# Patient Record
Sex: MALE | Race: WHITE | NOT HISPANIC OR LATINO | Employment: FULL TIME | URBAN - METROPOLITAN AREA
[De-identification: names, ages, dates, MRNs, and addresses within clinical notes are randomized per-mention and may not be internally consistent; named-entity substitution may affect disease eponyms.]

---

## 2017-03-07 ENCOUNTER — OFFICE VISIT (OUTPATIENT)
Dept: INTERNAL MEDICINE | Facility: CLINIC | Age: 52
End: 2017-03-07
Payer: COMMERCIAL

## 2017-03-07 ENCOUNTER — LAB VISIT (OUTPATIENT)
Dept: LAB | Facility: HOSPITAL | Age: 52
End: 2017-03-07
Attending: INTERNAL MEDICINE
Payer: COMMERCIAL

## 2017-03-07 VITALS
SYSTOLIC BLOOD PRESSURE: 126 MMHG | WEIGHT: 220.25 LBS | BODY MASS INDEX: 31.53 KG/M2 | HEIGHT: 70 IN | HEART RATE: 85 BPM | OXYGEN SATURATION: 96 % | TEMPERATURE: 98 F | DIASTOLIC BLOOD PRESSURE: 66 MMHG

## 2017-03-07 DIAGNOSIS — K76.0 FATTY LIVER: ICD-10-CM

## 2017-03-07 DIAGNOSIS — Z76.89 ENCOUNTER TO ESTABLISH CARE: Primary | ICD-10-CM

## 2017-03-07 DIAGNOSIS — Z86.010 HISTORY OF COLON POLYPS: ICD-10-CM

## 2017-03-07 DIAGNOSIS — E78.2 MIXED HYPERLIPIDEMIA: ICD-10-CM

## 2017-03-07 DIAGNOSIS — M47.26 OSTEOARTHRITIS OF SPINE WITH RADICULOPATHY, LUMBAR REGION: ICD-10-CM

## 2017-03-07 DIAGNOSIS — M50.30 DDD (DEGENERATIVE DISC DISEASE), CERVICAL: ICD-10-CM

## 2017-03-07 DIAGNOSIS — R53.83 LETHARGY: ICD-10-CM

## 2017-03-07 DIAGNOSIS — R53.82 CHRONIC FATIGUE: ICD-10-CM

## 2017-03-07 DIAGNOSIS — G47.33 OSA (OBSTRUCTIVE SLEEP APNEA): ICD-10-CM

## 2017-03-07 DIAGNOSIS — K21.00 GASTROESOPHAGEAL REFLUX DISEASE WITH ESOPHAGITIS: ICD-10-CM

## 2017-03-07 DIAGNOSIS — R10.13 ABDOMINAL PAIN, CHRONIC, EPIGASTRIC: ICD-10-CM

## 2017-03-07 DIAGNOSIS — G89.29 CHRONIC RADICULAR CERVICAL PAIN: ICD-10-CM

## 2017-03-07 DIAGNOSIS — F51.01 PRIMARY INSOMNIA: ICD-10-CM

## 2017-03-07 DIAGNOSIS — R74.8 ELEVATED LIVER ENZYMES: ICD-10-CM

## 2017-03-07 DIAGNOSIS — G89.29 ABDOMINAL PAIN, CHRONIC, EPIGASTRIC: ICD-10-CM

## 2017-03-07 DIAGNOSIS — K52.9 CHRONIC DIARRHEA: ICD-10-CM

## 2017-03-07 DIAGNOSIS — M54.12 CHRONIC RADICULAR CERVICAL PAIN: ICD-10-CM

## 2017-03-07 DIAGNOSIS — E66.09 NON MORBID OBESITY DUE TO EXCESS CALORIES: ICD-10-CM

## 2017-03-07 DIAGNOSIS — I10 ESSENTIAL HYPERTENSION: ICD-10-CM

## 2017-03-07 DIAGNOSIS — J30.1 SEASONAL ALLERGIC RHINITIS DUE TO POLLEN: ICD-10-CM

## 2017-03-07 DIAGNOSIS — F41.9 ANXIETY: ICD-10-CM

## 2017-03-07 PROBLEM — J30.2 SEASONAL ALLERGIC RHINITIS: Status: ACTIVE | Noted: 2017-03-07

## 2017-03-07 PROBLEM — Z86.0100 HISTORY OF COLON POLYPS: Status: ACTIVE | Noted: 2017-03-07

## 2017-03-07 LAB
ALBUMIN SERPL BCP-MCNC: 4 G/DL
ALP SERPL-CCNC: 66 U/L
ALT SERPL W/O P-5'-P-CCNC: 79 U/L
ANION GAP SERPL CALC-SCNC: 7 MMOL/L
AST SERPL-CCNC: 38 U/L
BASOPHILS # BLD AUTO: 0.01 K/UL
BASOPHILS NFR BLD: 0.2 %
BILIRUB SERPL-MCNC: 0.5 MG/DL
BILIRUB UR QL STRIP: NEGATIVE
BUN SERPL-MCNC: 14 MG/DL
CALCIUM SERPL-MCNC: 9.4 MG/DL
CHLORIDE SERPL-SCNC: 101 MMOL/L
CLARITY UR REFRACT.AUTO: CLEAR
CO2 SERPL-SCNC: 33 MMOL/L
COLOR UR AUTO: YELLOW
CREAT SERPL-MCNC: 1.1 MG/DL
DIFFERENTIAL METHOD: ABNORMAL
EOSINOPHIL # BLD AUTO: 0.1 K/UL
EOSINOPHIL NFR BLD: 1.9 %
ERYTHROCYTE [DISTWIDTH] IN BLOOD BY AUTOMATED COUNT: 12.2 %
EST. GFR  (AFRICAN AMERICAN): >60 ML/MIN/1.73 M^2
EST. GFR  (NON AFRICAN AMERICAN): >60 ML/MIN/1.73 M^2
GGT SERPL-CCNC: 51 U/L
GLUCOSE SERPL-MCNC: 130 MG/DL
GLUCOSE UR QL STRIP: NEGATIVE
HCT VFR BLD AUTO: 41.6 %
HGB BLD-MCNC: 13.6 G/DL
HGB UR QL STRIP: NEGATIVE
KETONES UR QL STRIP: NEGATIVE
LEUKOCYTE ESTERASE UR QL STRIP: NEGATIVE
LYMPHOCYTES # BLD AUTO: 1.5 K/UL
LYMPHOCYTES NFR BLD: 28.5 %
MCH RBC QN AUTO: 30.2 PG
MCHC RBC AUTO-ENTMCNC: 32.7 %
MCV RBC AUTO: 92 FL
MONOCYTES # BLD AUTO: 0.6 K/UL
MONOCYTES NFR BLD: 11.9 %
NEUTROPHILS # BLD AUTO: 3 K/UL
NEUTROPHILS NFR BLD: 57.5 %
NITRITE UR QL STRIP: NEGATIVE
PH UR STRIP: 6 [PH] (ref 5–8)
PLATELET # BLD AUTO: 194 K/UL
PMV BLD AUTO: 10.6 FL
POTASSIUM SERPL-SCNC: 3.9 MMOL/L
PROT SERPL-MCNC: 7.1 G/DL
PROT UR QL STRIP: NEGATIVE
RBC # BLD AUTO: 4.5 M/UL
SODIUM SERPL-SCNC: 141 MMOL/L
SP GR UR STRIP: 1.01 (ref 1–1.03)
TSH SERPL DL<=0.005 MIU/L-ACNC: 2.64 UIU/ML
URN SPEC COLLECT METH UR: NORMAL
UROBILINOGEN UR STRIP-ACNC: NEGATIVE EU/DL
WBC # BLD AUTO: 5.19 K/UL

## 2017-03-07 PROCEDURE — 81003 URINALYSIS AUTO W/O SCOPE: CPT

## 2017-03-07 PROCEDURE — 3074F SYST BP LT 130 MM HG: CPT | Mod: S$GLB,,, | Performed by: INTERNAL MEDICINE

## 2017-03-07 PROCEDURE — 83516 IMMUNOASSAY NONANTIBODY: CPT | Mod: 59

## 2017-03-07 PROCEDURE — 83036 HEMOGLOBIN GLYCOSYLATED A1C: CPT

## 2017-03-07 PROCEDURE — 82977 ASSAY OF GGT: CPT | Mod: 91

## 2017-03-07 PROCEDURE — 3078F DIAST BP <80 MM HG: CPT | Mod: S$GLB,,, | Performed by: INTERNAL MEDICINE

## 2017-03-07 PROCEDURE — 99999 PR PBB SHADOW E&M-NEW PATIENT-LVL IV: CPT | Mod: PBBFAC,,, | Performed by: INTERNAL MEDICINE

## 2017-03-07 PROCEDURE — 1160F RVW MEDS BY RX/DR IN RCRD: CPT | Mod: S$GLB,,, | Performed by: INTERNAL MEDICINE

## 2017-03-07 PROCEDURE — 84443 ASSAY THYROID STIM HORMONE: CPT

## 2017-03-07 PROCEDURE — 80053 COMPREHEN METABOLIC PANEL: CPT

## 2017-03-07 PROCEDURE — 36415 COLL VENOUS BLD VENIPUNCTURE: CPT

## 2017-03-07 PROCEDURE — 80074 ACUTE HEPATITIS PANEL: CPT

## 2017-03-07 PROCEDURE — 82247 BILIRUBIN TOTAL: CPT

## 2017-03-07 PROCEDURE — 99205 OFFICE O/P NEW HI 60 MIN: CPT | Mod: S$GLB,,, | Performed by: INTERNAL MEDICINE

## 2017-03-07 PROCEDURE — 87086 URINE CULTURE/COLONY COUNT: CPT

## 2017-03-07 PROCEDURE — 85025 COMPLETE CBC W/AUTO DIFF WBC: CPT

## 2017-03-07 RX ORDER — LANSOPRAZOLE 30 MG/1
1 CAPSULE, DELAYED RELEASE ORAL 2 TIMES DAILY
Refills: 0 | COMMUNITY
Start: 2017-01-26 | End: 2017-03-07 | Stop reason: ALTCHOICE

## 2017-03-07 RX ORDER — TELMISARTAN 40 MG/1
1 TABLET ORAL NIGHTLY
Refills: 1 | COMMUNITY
Start: 2017-03-02

## 2017-03-07 RX ORDER — ESCITALOPRAM OXALATE 20 MG/1
20 TABLET ORAL DAILY
Qty: 30 TABLET | Refills: 11 | Status: SHIPPED | OUTPATIENT
Start: 2017-03-07 | End: 2018-03-07

## 2017-03-07 RX ORDER — ESCITALOPRAM OXALATE 10 MG/1
1 TABLET ORAL NIGHTLY
Refills: 1 | COMMUNITY
Start: 2017-03-02 | End: 2017-03-07 | Stop reason: DRUGHIGH

## 2017-03-07 RX ORDER — TRAMADOL HYDROCHLORIDE 50 MG/1
TABLET ORAL
Refills: 0 | COMMUNITY
Start: 2017-02-25

## 2017-03-07 RX ORDER — MULTIVITAMIN
1 TABLET ORAL DAILY
COMMUNITY

## 2017-03-07 RX ORDER — HYDROCODONE BITARTRATE AND ACETAMINOPHEN 10; 325 MG/1; MG/1
TABLET ORAL
Refills: 0 | COMMUNITY
Start: 2017-02-23

## 2017-03-07 RX ORDER — PANTOPRAZOLE SODIUM 40 MG/1
40 TABLET, DELAYED RELEASE ORAL 2 TIMES DAILY
Qty: 28 TABLET | Refills: 0 | Status: SHIPPED | OUTPATIENT
Start: 2017-03-07 | End: 2017-04-19

## 2017-03-07 RX ORDER — LORATADINE 10 MG/1
10 TABLET ORAL DAILY
COMMUNITY

## 2017-03-07 RX ORDER — ALPRAZOLAM 1 MG/1
TABLET ORAL
Refills: 2 | COMMUNITY
Start: 2017-01-23 | End: 2017-03-07

## 2017-03-07 RX ORDER — EZETIMIBE AND SIMVASTATIN 10; 40 MG/1; MG/1
1 TABLET ORAL NIGHTLY
Refills: 1 | COMMUNITY
Start: 2017-01-19

## 2017-03-07 NOTE — MR AVS SNAPSHOT
[facility]  [address]  [facility] [address]  Phone: [phone]  Fax: [phone]                  Janny Duncan   3/7/2017 11:20 AM   Office Visit    Description:  Male : 1965   Provider:  Sona Boyd MD   Department:  [facility]           Reason for Visit     Abdominal Pain     Fatigue     Dizziness           Diagnoses this Visit        Comments    Encounter to establish care    -  Primary     Gastroesophageal reflux disease with esophagitis         Essential hypertension         Non morbid obesity due to excess calories         Mixed hyperlipidemia         Seasonal allergic rhinitis due to pollen         Primary insomnia         Anxiety         Chronic radicular cervical pain         DDD (degenerative disc disease), cervical         Osteoarthritis of spine with radiculopathy, lumbar region         Fatty liver         Elevated liver enzymes         History of colon polyps         Chronic fatigue         Lethargy         Chronic diarrhea         Abdominal pain, chronic, epigastric         BALA (obstructive sleep apnea)                To Do List           Goals (5 Years of Data)     None      Follow-Up and Disposition     Return in about 4 weeks (around 2017) for for follow up.       These Medications        Disp Refills Start End    pantoprazole (PROTONIX) 40 MG tablet 28 tablet 0 3/7/2017 3/21/2017    Take 1 tablet (40 mg total) by mouth 2 (two) times daily. - Oral    Pharmacy: [facility] [address] [place] [address] AT [facility] Ph #: [phone]       escitalopram oxalate (LEXAPRO) 20 MG tablet 30 tablet 11 3/7/2017 3/7/2018    Take 1 tablet (20 mg total) by mouth once daily. - Oral    Pharmacy: [facility] [address] [place] [address] [place] - [address] [place] [address] AT [facility] Ph #: [phone]         [facility]     [facility] -   Assistance  Registered nurses in the [facility] provide clinical advisement, health education, appointment booking, and other advisory services.  Call for this free service at [phone].             Medications           Message regarding Medications     Verify the changes and/or additions to your medication regime listed below are the same as discussed with your clinician today.  If any of these changes or additions are incorrect, please notify your healthcare provider.        START taking these NEW medications        Refills    pantoprazole (PROTONIX) 40 MG tablet 0    Sig: Take 1 tablet (40 mg total) by mouth 2 (two) times daily.    Class: Normal    Route: Oral    escitalopram oxalate (LEXAPRO) 20 MG tablet 11    Sig: Take 1 tablet (20 mg total) by mouth once daily.    Class: Normal    Route: Oral      STOP taking these medications     alprazolam (XANAX) 1 MG tablet     lansoprazole (PREVACID) 30 MG capsule Take 1 capsule by mouth 2 (two) times daily.           Verify that the below list of medications is an accurate representation of the medications you are currently taking.  If none reported, the list may be blank. If incorrect, please contact your healthcare provider. Carry this list with you in case of emergency.           Current Medications     hydrocodone-acetaminophen 10-325mg (NORCO)  mg Tab TK 1 T PO BID PRN P    loratadine (CLARITIN) 10 mg tablet Take 10 mg by mouth once daily.    multivitamin (ONE DAILY MULTIVITAMIN) per tablet Take 1 tablet by mouth once daily.    telmisartan (MICARDIS) 40 MG Tab Take 1 tablet by mouth every evening.    VYTORIN 10-40 10-40 mg per tablet Take 1 tablet by mouth every evening.    escitalopram oxalate (LEXAPRO) 20 MG tablet Take 1 tablet (20 mg total) by mouth once daily.    pantoprazole (PROTONIX) 40 MG tablet Take 1 tablet (40 mg total) by mouth 2 (two) times daily.    tramadol (ULTRAM) 50 mg tablet TK 1 T PO BID PRN           Clinical Reference  "Information           Your Vitals Were     BP Pulse Temp Height Weight SpO2    126/66 (BP Location: Right arm, Patient Position: Sitting, BP Method: Manual) 85 98.2 °F (36.8 °C) (Oral) 5' 10" (1.778 m) 99.9 kg (220 lb 3.8 oz) 96%    BMI                31.6 kg/m2          Blood Pressure          Most Recent Value    BP  126/66      Allergies as of 3/7/2017     Hyoscyamine      Immunizations Administered on Date of Encounter - 3/7/2017     None      Orders Placed During Today's Visit      Normal Orders This Visit    Ambulatory referral to Gastroenterology     Ambulatory referral to Physical Medicine Rehab     Urinalysis     Urine culture     Future Labs/Procedures Expected by Expires    CBC auto differential  3/7/2017 3/7/2018    Comprehensive metabolic panel  3/7/2017 3/7/2018    Gamma GT  3/7/2017 3/7/2018    HCV FibroSURE  3/7/2017 5/6/2018    Hemoglobin A1c  3/7/2017 5/6/2018    Hepatitis panel, acute  3/7/2017 3/7/2018    Tissue Transglutaminase Abs, IgA/IgG  3/7/2017 3/7/2018    TSH  3/7/2017 3/7/2018      Smoking Cessation     If you would like to quit smoking:   You may be eligible for free services if you are a [place] resident and started smoking cigarettes before September 1, 1988.  Call the Smoking Cessation Trust ([facility]) toll free at (850) 934-9070 or (484) 840-1278.   Call 1-800-QUIT-NOW if you do not meet the above criteria.            Language Assistance Services     ATTENTION: Language assistance services are available, free of charge. Please call [phone].      ATENCIÓN: Si habla español, tiene a ernst disposición servicios gratuitos de asistencia lingüística. Llame al [phone].     KELLY Ý: N?u b?n nói Ti?ng Vi?t, có các d?ch v? h? tr? ngôn ng? mi?n phí dành cho b?n. G?i s? [phone].         [facility] complies with applicable Federal civil rights laws and does not discriminate on the basis of race, color, national origin, age, disability, or sex.        "

## 2017-03-07 NOTE — PROGRESS NOTES
Subjective:       Patient ID: Janny Duncan is a 52 y.o. male.    Chief Complaint: Abdominal Pain; Fatigue; and Dizziness    HPI Comments: 53 y/o male with hx of HTN, HPL, Chronic Neck Pain, GERD, Obesity, BALA, Fatty Liver with elevated Liver Enzymes, Depression & Anxiety, comes in for an urgent care appt with his wife and son. He is new to me and new to [facility]. He has been receiving previous medical care in [place], [place] by Dr. Patrick Ackerman (PCP), Dr. Reji River (Internal medicine), and Dr. Demario Rivas (GI).     He notes all his symptoms began 1/2/17 and began with sudden onset of severe burning epigastric pain, associated with 1-2 days of dark stools, fatigue, and bloating. He denied fever, blood in stool, nausea, or vomiting. He admits to previous heavy daily ETOH intake 4-12 drinks/night in past. He has stopped all ETOH intake since 1/2/17. He took OTC antacids, PPIs, and Zantac without relief. He noted intermittent soft to watery stool 1-2 x week since then.     He saw his PCP who referred him to GI who did workup which included elevated Liver enzymes (SGPT-82, SGOT-39) with normal ALP, T.Derick, CBC, Plt, Alb, D-dimer. Abdominal US was c/w Fatty Liver. EGD (2/3/17) was normal. CT Abd/Pelvis (2/8/17) and C-Scope 2/2017 were normal. Because of intermittent atypical chest pain, patient was admitted 2/13/17 and a Cardiac Cath was done, which was normal.     Patient took Dexilant 60 mg/day from 1/31/17 to 2/22/17 with initial improvement in his chest and epigastric discomfort only to recur after stopping on his own. He comes in today complaining of recurrence of burning substernal and epigastric pain, bloating, and generalized fatigue. He started OTC Prevacid yesterday.        PMH:  1. Hx of GERD           Nexium 30 mg/day    2. Obesity (BMI-31)    3. HTN          Telmisartan 40 mg/day    4. HPL          Vytorin (Ezetimibe - Simvastatin) 10-40 mg hs    5. BALA          CPAP    6. Fatty Liver with elevated LFS    7.  Hx of Colon Polyps          C-Scope 9/2016 - polyp          C-Scope 2/2017 - normal    8. DDD C/S with (B) Radiculopathy         Pain & Numbness (B) UEss         Hx of Cervical Fusion    9. Chronic LBP with Radiculopathy          Hx of DJD L/S Spine            10. Anxiety              Xanax prn              Lexapro 10 mg/day    11. AR            Claritin daily    12. Hemorrhoids    13. Insomnia    14. Prior Hx of ETOH Abuse        MEDS:        Claritin daily        Lexapro 10 mg/day        MVI        Norco  mg - 1/2 tablet daily prn        Prevacid 30 mg        Telmisartan 4o mg/day        Tramadol 50 mg BID prn - not taking        Vytorin (Ezetimibe - Simvastatin) 10-40 mg hs        Xanax prn      ALLERGIES:        Hycosamine - blurred Vision    Surgical Hx:        Appendectomy        C-Fusion    Social Hx:      Smoking Hx: Non-Smoker, occasional Cigar      ETOH: Prior past hx of heavy ETOH intake 4-12 drinks per day      Exercise: None      Work Hx:  in [place]      Home Environment:       Family Hx:       (+) HTN - Mother       (+) DM - Father       (+) Colon CA - MGF       (+) Breast CA - PGM       (+) Melanoma - Father      Preventive Health Screening & Recent Lab Results:              Annual PE: 3/7/17   CBC   CMP   TSH   Lipids   Hgb A1c      Eye Exam   Colonoscopy 9/2016 - polyp                         C-Scope 2/2017 - normal   Immunizations     Influenza    Tdp   Other:                     TTG                     Hepatitis Panel                     Fibrosure                    Abdominal US was c/w Fatty Liver.                    EGD (2/3/17): normal                    CT Abd/Pelvis (2/8/17): normal     Abdominal Pain   Associated symptoms include diarrhea (intermittent soft to watery stools at leastonce a week alternating with hard stools). Pertinent negatives include no dysuria, fever, headaches, hematuria, nausea or vomiting.   Fatigue   Associated symptoms include abdominal pain  (indigestion and diffuse abdominal discomfort. Burning epigastric pain), chest pain (atypical chest pain, worse with lying down, non-exertional), fatigue, neck pain and numbness (numbness and pain (B) UEs). Pertinent negatives include no chills, coughing, diaphoresis, fever, headaches, nausea, sore throat, vomiting or weakness.   Dizziness:    Associated symptoms: light-headedness and chest pain (atypical chest pain, worse with lying down, non-exertional).no fever, no headaches, no nausea, no vomiting, no diaphoresis, no weakness and no palpitations.    Review of Systems   Constitutional: Positive for fatigue. Negative for chills, diaphoresis, fever and unexpected weight change.   HENT: Negative for sore throat, trouble swallowing and voice change.    Eyes: Negative for visual disturbance.   Respiratory: Negative for cough, chest tightness, shortness of breath and wheezing.    Cardiovascular: Positive for chest pain (atypical chest pain, worse with lying down, non-exertional). Negative for palpitations and leg swelling.   Gastrointestinal: Positive for abdominal pain (indigestion and diffuse abdominal discomfort. Burning epigastric pain) and diarrhea (intermittent soft to watery stools at leastonce a week alternating with hard stools). Negative for blood in stool, nausea and vomiting.   Genitourinary: Positive for difficulty urinating. Negative for dysuria and hematuria.   Musculoskeletal: Positive for back pain and neck pain.   Neurological: Positive for dizziness, light-headedness and numbness (numbness and pain (B) UEs). Negative for syncope, weakness and headaches.   Hematological: Negative for adenopathy.   Psychiatric/Behavioral: Positive for sleep disturbance. The patient is nervous/anxious.        Objective:      Physical Exam   Constitutional: He is oriented to person, place, and time. He appears well-developed and well-nourished. No distress.   HENT:   Head: Normocephalic.   Right Ear: External ear normal.    Left Ear: External ear normal.   Nose: Nose normal.   Mouth/Throat: Oropharynx is clear and moist.   Eyes: Conjunctivae are normal. Pupils are equal, round, and reactive to light. No scleral icterus.   Neck: No JVD present. No thyromegaly present.   Cardiovascular: Normal rate, regular rhythm and normal heart sounds.  Exam reveals no gallop.    No murmur heard.  Pulmonary/Chest: Effort normal. No respiratory distress. He has no wheezes. He has no rales.   Abdominal: Soft. Bowel sounds are normal. He exhibits no distension and no mass. There is no tenderness.   Musculoskeletal: He exhibits no edema.   Lymphadenopathy:     He has no cervical adenopathy.   Neurological: He is alert and oriented to person, place, and time. He has normal strength and normal reflexes.   Skin: Skin is warm and dry. He is not diaphoretic.   Psychiatric: He has a normal mood and affect.       Assessment:   1. Chronic Epigastric Pain - GERD  2. Obesity  3. HTN  4. HPL  5. BALA  6. Intermittent Diarrhea  7. Hx of colon Polyps  8. DDD C/S with (B) Radiculopathy  9. Chronic LBP with Radiculopathy  10. Fatty Liver with elevated LFS  11. Chronic Fatigue & Lethargy  12. Anxiety  13. Insomnia  14. Prior Hx of ETOH Abuse  Plan:   1. CBC, CMP, TSH, TTG, Hgb A1c, Fibrosure, Hepatic Panel, GGT, U/A  2. Protonix 40 mg BID. Metamucil daily, Increase Lexapro to 20 mg/day  3. Anti-reflux Diet, Exercise, Lose Weight  4. Referral to GI and Physical Medicine  5. RTC in 1 month for follow up

## 2017-03-08 LAB
ESTIMATED AVG GLUCOSE: 123 MG/DL
HAV IGM SERPL QL IA: NEGATIVE
HBA1C MFR BLD HPLC: 5.9 %
HBV CORE IGM SERPL QL IA: NEGATIVE
HBV SURFACE AG SERPL QL IA: NEGATIVE
HCV AB SERPL QL IA: NEGATIVE

## 2017-03-09 LAB
BACTERIA UR CULT: NO GROWTH
TTG IGA SER IA-ACNC: 3 UNITS
TTG IGG SER IA-ACNC: 2 UNITS

## 2017-03-12 LAB
A2 MACROGLOB SERPL-MCNC: 152 MG/DL (ref 106–279)
ALT SERPL W P-5'-P-CCNC: 59 U/L (ref 9–46)
APO A-I SERPL-MCNC: 138 MG/DL (ref 94–176)
BILIRUB SERPL-MCNC: 0.4 MG/DL (ref 0.2–1.2)
FIBROSIS STAGE SERPL QL: ABNORMAL
FIBROTEST INTERPRETATION: ABNORMAL
FOOTNOTE: ABNORMAL
GGT SERPL-CCNC: 41 U/L (ref 3–95)
HAPTOGLOB SERPL-MCNC: 138 MG/DL (ref 43–212)
LIVER FIBR SCORE SERPL CALC.FIBROSURE: 0.15
NECROINFLAMMAT INTERP: ABNORMAL
NECROINFLAMMATORY ACT GRADE SERPL QL: ABNORMAL
NECROINFLAMMATORY ACT SCORE SERPL: 0.3
REFERENCE ID: ABNORMAL

## 2017-03-15 ENCOUNTER — PATIENT MESSAGE (OUTPATIENT)
Dept: GASTROENTEROLOGY | Facility: CLINIC | Age: 52
End: 2017-03-15

## 2017-04-07 ENCOUNTER — TELEPHONE (OUTPATIENT)
Dept: PHYSICAL MEDICINE AND REHAB | Facility: CLINIC | Age: 52
End: 2017-04-07

## 2017-04-07 NOTE — TELEPHONE ENCOUNTER
----- Message from Mandy Cornell sent at 4/7/2017  8:43 AM CDT -----  Contact: self  Patient states need to cancel 10:40am appointment this morning.

## 2017-04-08 ENCOUNTER — DOCUMENTATION ONLY (OUTPATIENT)
Dept: INTERNAL MEDICINE | Facility: CLINIC | Age: 52
End: 2017-04-08

## 2017-04-08 DIAGNOSIS — R73.03 PRE-DIABETES: ICD-10-CM

## 2017-04-08 NOTE — PROGRESS NOTES
Pre-Visit Review & Summary:    51 y/o male with hx of HTN, HPL, Chronic Neck Pain, GERD, Obesity, BALA, Fatty Liver with elevated Liver Enzymes, Depression & Anxiety, has a scheduled appt 4/19/17 for follow up to initial visit 3/17/17.    He had been receiving previous medical care in [place], [place] by Dr. Patrick Ackerman (PCP), Dr. Reji River (Internal medicine), and Dr. Demario Rivas (GI).     He noted all his symptoms began 1/2/17 with sudden onset of severe burning epigastric pain, associated with 1-2 days of dark stools, fatigue, and bloating. He denied fever, blood in stool, nausea, or vomiting. He admitted to previous heavy daily ETOH intake 4-12 drinks/night in past. He had stopped all ETOH intake since 1/2/17. He took OTC antacids, PPIs, and Zantac without relief. He noted intermittent soft to watery stool 1-2 x week since then.     Workup in [place], [place] included elevated Liver enzymes (SGPT-82, SGOT-39) with normal ALP, T.Derick, CBC, Plt, Alb, D-dimer. Abdominal US was c/w Fatty Liver. EGD (2/3/17) was normal. CT Abd/Pelvis (2/8/17) and C-Scope 2/2017 were normal. Because of intermittent atypical chest pain, patient was admitted 2/13/17 and a Cardiac Cath was done, which was normal.     Patient was prescribed Dexilant 60 mg/day from 1/31/17 to 2/22/17 with initial improvement in his chest and epigastric discomfort only to recur after stopping on his own. At initial visit 3/17/17, he was complaining of recurrence of burning substernal and epigastric pain, bloating, and generalized fatigue.He was given information on an Anti-Reflux Diet and started on Protonix 40 mg BID. He was referred to GI and has an appt with Dr. Vick 4/19/17.    Lab from 3/17/17 revealed Hgb-13.6, Glu-130, Hgb A1c-5.9, ALT-79, normal AST, T.Derick, and GGT, negative acute Viral Hepatitis Panel, and Fibrosure -F0      PMH:  1. Hx of GERD and Epigastric Pain           Protonix 40 mg BID           EGD ([facility]) 2/2017 - normal    2. Obesity  (BMI-31)    3. HTN          Telmisartan 40 mg/day    4. HPL          Vytorin (Ezetimibe - Simvastatin) 10-40 mg hs    5. BALA          CPAP    6. Fatty Liver with elevated LFS           ALT-79, normal AST, T.Derick, and GGT           Acute Viral Hepatitis Panel - negative           Fibrosure -F0           Abd US ([facility])  (2/2017): Fatty Liver           CT Abdomen & Pelvis  ([place]) (2/2017): normal    7. Hx of Colon Polyps          C-Scope 9/2016 ([place])  - polyp          C-Scope 2/2017 ([place]) - normal    8. DDD C/S with (B) Radiculopathy         Pain & Numbness (B) UEss         Hx of Cervical Fusion    9. Chronic LBP with Radiculopathy          Hx of DJD L/S Spine            10. Anxiety              Xanax prn              Lexapro 20 mg/day    11. AR            Claritin daily    12. Hemorrhoids    13. Insomnia    14. Prior Hx of ETOH Abuse    15. Pre-DM        MEDS:        Claritin daily        Lexapro 20 mg/day        Metamucil        MVI        Norco  mg - 1/2 tablet daily prn        Protonix 40 mg BID        Telmisartan 4o mg/day        Vytorin (Ezetimibe - Simvastatin) 10-40 mg hs        Xanax prn      ALLERGIES:        Hycosamine - blurred Vision    Surgical Hx:        Appendectomy        C-Fusion    Social Hx:      Smoking Hx: Non-Smoker, occasional Cigar      ETOH: Prior past hx of heavy ETOH intake 4-12 drinks per day      Exercise: None      Work Hx:  in [place]      Home Environment:       Family Hx:       (+) HTN - Mother       (+) DM - Father       (+) Colon CA - MGF       (+) Breast CA - PGM       (+) Melanoma - Father      Preventive Health Screening & Recent Lab Results:              Annual PE: 3/17/17   CBC (3/2017): H/H-13.6/41.6   CMP (3/2017): Glu-130, ALT-79   TSH (3/2017): 2.642   Lipids   Hgb A1c (3/2017): 5.9      Eye Exam   Colonoscopy 9/2016 - polyp                         C-Scope 2/2017 ([place]) - normal   Immunizations     Influenza    Tdp   Other:                      TTG                     Hepatitis Panel                     Fibrosure                     Abdominal US 2/2017 ([facility]): c/w Fatty Liver.                     EGD (2/3/17) ([facility]) : normal                     CT Abd/Pelvis ([facility]) (2/8/17): normal                      Cardiac Cath  ([facility]) 2/2017: normal    IMP:  1. HTN  2. Obesity  3. HPL  4. GERD  5. BALA  6. Fatty Liver with elevated LFS  7. Pre-DM  8. DDD C/S and L/S with (B) Radiculopathy  9. Hx of Colon Polyps  10. Anxiety & Insomnia

## 2017-04-19 ENCOUNTER — TELEPHONE (OUTPATIENT)
Dept: ENDOSCOPY | Facility: HOSPITAL | Age: 52
End: 2017-04-19

## 2017-04-19 ENCOUNTER — OFFICE VISIT (OUTPATIENT)
Dept: INTERNAL MEDICINE | Facility: CLINIC | Age: 52
End: 2017-04-19
Payer: COMMERCIAL

## 2017-04-19 ENCOUNTER — OFFICE VISIT (OUTPATIENT)
Dept: GASTROENTEROLOGY | Facility: CLINIC | Age: 52
End: 2017-04-19
Payer: COMMERCIAL

## 2017-04-19 VITALS
DIASTOLIC BLOOD PRESSURE: 80 MMHG | OXYGEN SATURATION: 98 % | HEART RATE: 73 BPM | SYSTOLIC BLOOD PRESSURE: 132 MMHG | BODY MASS INDEX: 30.65 KG/M2 | HEIGHT: 70 IN | WEIGHT: 214.06 LBS

## 2017-04-19 VITALS
HEART RATE: 76 BPM | BODY MASS INDEX: 30.65 KG/M2 | SYSTOLIC BLOOD PRESSURE: 127 MMHG | WEIGHT: 214.06 LBS | DIASTOLIC BLOOD PRESSURE: 81 MMHG | HEIGHT: 70 IN

## 2017-04-19 DIAGNOSIS — E78.2 MIXED HYPERLIPIDEMIA: ICD-10-CM

## 2017-04-19 DIAGNOSIS — I10 ESSENTIAL HYPERTENSION: ICD-10-CM

## 2017-04-19 DIAGNOSIS — Z86.010 HISTORY OF COLON POLYPS: ICD-10-CM

## 2017-04-19 DIAGNOSIS — D64.9 ANEMIA OF UNKNOWN ETIOLOGY: ICD-10-CM

## 2017-04-19 DIAGNOSIS — F41.9 ANXIETY: ICD-10-CM

## 2017-04-19 DIAGNOSIS — F51.01 PRIMARY INSOMNIA: ICD-10-CM

## 2017-04-19 DIAGNOSIS — K76.0 FATTY LIVER: ICD-10-CM

## 2017-04-19 DIAGNOSIS — R74.8 ELEVATED LIVER ENZYMES: ICD-10-CM

## 2017-04-19 DIAGNOSIS — K21.00 GASTROESOPHAGEAL REFLUX DISEASE WITH ESOPHAGITIS: Primary | ICD-10-CM

## 2017-04-19 DIAGNOSIS — E66.09 NON MORBID OBESITY DUE TO EXCESS CALORIES: ICD-10-CM

## 2017-04-19 DIAGNOSIS — K21.9 GASTROESOPHAGEAL REFLUX DISEASE, ESOPHAGITIS PRESENCE NOT SPECIFIED: Primary | ICD-10-CM

## 2017-04-19 DIAGNOSIS — M47.26 OSTEOARTHRITIS OF SPINE WITH RADICULOPATHY, LUMBAR REGION: ICD-10-CM

## 2017-04-19 DIAGNOSIS — M50.30 DDD (DEGENERATIVE DISC DISEASE), CERVICAL: ICD-10-CM

## 2017-04-19 DIAGNOSIS — M54.12 CHRONIC RADICULAR CERVICAL PAIN: ICD-10-CM

## 2017-04-19 DIAGNOSIS — G89.29 CHRONIC RADICULAR CERVICAL PAIN: ICD-10-CM

## 2017-04-19 DIAGNOSIS — R73.03 PRE-DIABETES: ICD-10-CM

## 2017-04-19 DIAGNOSIS — G47.33 OSA (OBSTRUCTIVE SLEEP APNEA): ICD-10-CM

## 2017-04-19 PROCEDURE — 1160F RVW MEDS BY RX/DR IN RCRD: CPT | Mod: S$GLB,,, | Performed by: INTERNAL MEDICINE

## 2017-04-19 PROCEDURE — 99204 OFFICE O/P NEW MOD 45 MIN: CPT | Mod: S$GLB,,, | Performed by: INTERNAL MEDICINE

## 2017-04-19 PROCEDURE — 99999 PR PBB SHADOW E&M-EST. PATIENT-LVL II: CPT | Mod: PBBFAC,,, | Performed by: INTERNAL MEDICINE

## 2017-04-19 PROCEDURE — 3079F DIAST BP 80-89 MM HG: CPT | Mod: S$GLB,,, | Performed by: INTERNAL MEDICINE

## 2017-04-19 PROCEDURE — 99214 OFFICE O/P EST MOD 30 MIN: CPT | Mod: S$GLB,,, | Performed by: INTERNAL MEDICINE

## 2017-04-19 PROCEDURE — 3075F SYST BP GE 130 - 139MM HG: CPT | Mod: S$GLB,,, | Performed by: INTERNAL MEDICINE

## 2017-04-19 PROCEDURE — 99999 PR PBB SHADOW E&M-EST. PATIENT-LVL III: CPT | Mod: PBBFAC,,, | Performed by: INTERNAL MEDICINE

## 2017-04-19 PROCEDURE — 3074F SYST BP LT 130 MM HG: CPT | Mod: S$GLB,,, | Performed by: INTERNAL MEDICINE

## 2017-04-19 RX ORDER — LANSOPRAZOLE 30 MG/1
30 CAPSULE, DELAYED RELEASE ORAL DAILY
COMMUNITY
End: 2017-07-05 | Stop reason: SDUPTHER

## 2017-04-19 RX ORDER — HYOSCYAMINE SULFATE 0.125 MG
125 TABLET ORAL EVERY 4 HOURS PRN
COMMUNITY

## 2017-04-19 NOTE — MR AVS SNAPSHOT
[facility] - Internal Medicine  [address]  [place] [address]  Phone: [phone]  Fax: [phone]                  Janny Duncan   2017 10:40 AM   Office Visit    Description:  Male : 1965   Provider:  Sona Boyd MD   Department:  [facility] - Internal Medicine           Reason for Visit     Follow-up           Diagnoses this Visit        Comments    Gastroesophageal reflux disease with esophagitis    -  Primary     Essential hypertension         Non morbid obesity due to excess calories         Mixed hyperlipidemia         Primary insomnia         Anxiety         Chronic radicular cervical pain         DDD (degenerative disc disease), cervical         Osteoarthritis of spine with radiculopathy, lumbar region         Fatty liver         Elevated liver enzymes         History of colon polyps         BALA (obstructive sleep apnea)         Pre-diabetes         Anemia of unknown etiology                To Do List           Your Future Surgeries/Procedures     2017   Surgery with Ponce Jurado MD   [facility] ([facility])    [address] [facility] [address] [phone]              Goals (5 Years of Data)     None      Follow-Up and Disposition     Return if symptoms worsen or fail to improve.    Follow-up and Disposition History      [facility]     [facility] Nurse Care Line -  Assistance  Unless otherwise directed by your provider, please contact [facility], our nurse care line that is available for  assistance.     Registered nurses in the [facility] provide: appointment scheduling, clinical advisement, health education, and other advisory services.  Call: [phone] (toll free)               Medications           Message regarding Medications     Verify the changes and/or additions to your medication regime listed below are the same as discussed with your clinician today.  If  "any of these changes or additions are incorrect, please notify your healthcare provider.             Verify that the below list of medications is an accurate representation of the medications you are currently taking.  If none reported, the list may be blank. If incorrect, please contact your healthcare provider. Carry this list with you in case of emergency.           Current Medications     escitalopram oxalate (LEXAPRO) 20 MG tablet Take 1 tablet (20 mg total) by mouth once daily.    hydrocodone-acetaminophen 10-325mg (NORCO)  mg Tab TK 1 T PO BID PRN P    hyoscyamine (ANASPAZ,LEVSIN) 0.125 mg Tab Take 125 mcg by mouth every 4 (four) hours as needed.    lansoprazole (PREVACID) 30 MG capsule Take 30 mg by mouth once daily.    loratadine (CLARITIN) 10 mg tablet Take 10 mg by mouth once daily.    multivitamin (ONE DAILY MULTIVITAMIN) per tablet Take 1 tablet by mouth once daily.    telmisartan (MICARDIS) 40 MG Tab Take 1 tablet by mouth every evening.    tramadol (ULTRAM) 50 mg tablet TK 1 T PO BID PRN    VYTORIN 10-40 10-40 mg per tablet Take 1 tablet by mouth every evening.           Clinical Reference Information           Your Vitals Were     BP Pulse Height Weight SpO2 BMI    132/80 73 5' 10" (1.778 m) 97.1 kg (214 lb 1.1 oz) 98% 30.72 kg/m2      Blood Pressure          Most Recent Value    BP  132/80      Allergies as of 4/19/2017     Hyoscyamine      Immunizations Administered on Date of Encounter - 4/19/2017     None      Orders Placed During Today's Visit      Normal Orders This Visit    Ambulatory Referral to Hepatology     Future Labs/Procedures Expected by Expires    CBC auto differential  4/19/2017 4/19/2018    Ferritin  4/19/2017 6/18/2018    Iron and TIBC  4/19/2017 6/18/2018    Lipid panel  4/19/2017 4/19/2018      Smoking Cessation     If you would like to quit smoking:   You may be eligible for free services if you are a [place] resident and started smoking cigarettes before September 1, " 1988.  Call the Smoking Cessation Trust ([facility]) toll free at (343) 903-2476 or (608) 732-3884.   Call 1-800-QUIT-NOW if you do not meet the above criteria.   Contact us via email: [email or web address]   View our website for more information: [email or web address]        Language Assistance Services     ATTENTION: Language assistance services are available, free of charge. Please call [phone].      ATENCIÓN: Si celestine tomeka, tiene a ernst disposición servicios gratuitos de asistencia lingüística. Llame al [phone].     CHÚ Ý: N?u b?n nói Ti?ng Vi?t, có các d?ch v? h? tr? ngôn ng? mi?n phí dành cho b?n. G?i s? [phone].         [facility] complies with applicable Federal civil rights laws and does not discriminate on the basis of race, color, national origin, age, disability, or sex.

## 2017-04-19 NOTE — PROGRESS NOTES
Subjective:       Patient ID: Janny Duncan is a 52 y.o. male.    Chief Complaint: Follow-up (fatigue)    HPI Comments: 53 y/o male with hx of HTN, HPL, Chronic Neck Pain, GERD, Obesity, BALA, Fatty Liver with elevated Liver Enzymes, Depression & Anxiety, comes in for follow up to his initial visit 3/17/17.    He had been receiving previous medical care in [place], [place] by Dr. Patrick Ackerman (PCP), Dr. Reji River (Internal medicine), and Dr. Demario Rivas (GI).     He noted all his symptoms began 1/2/17 with sudden onset of severe burning epigastric pain, associated with 1-2 days of dark stools, fatigue, and bloating. He denied fever, blood in stool, nausea, or vomiting. He admitted to previous heavy daily ETOH intake 4-12 drinks/night in past. He had stopped all ETOH intake since 1/2/17. He took OTC antacids, PPIs, and Zantac without relief.     Workup in [place], [place] included elevated Liver enzymes (SGPT-82, SGOT-39) with normal ALP, T.Derick, CBC, Plt, Alb, D-dimer. Abdominal US was c/w Fatty Liver. EGD (2/3/17) was normal. CT Abd/Pelvis (2/8/17) and C-Scope 2/2017 were normal. Because of intermittent atypical chest pain, patient was admitted 2/13/17 in [place], [place] and a Cardiac Cath was done, which was normal.     Patient was prescribed Dexilant 60 mg/day by PCP in [place] from 1/31/17 to 2/22/17 with initial improvement in his chest and epigastric discomfort only to recur after stopping on his own. At initial visit 3/17/17, he was complaining of recurrence of burning substernal and epigastric pain, bloating, and generalized fatigue.He was given information on an Anti-Reflux Diet and started on Protonix 40 mg BID. He took Protonix for 2 weeks and strictly followed Anti-Reflux Diet with remarkable improvement in his GI complaints. He is no longer taking Protonix and has switched to Prevacid 20 mg/day. He notes symptoms resolved including he is no longer having fatigue or lethargy. He has been takin Metamucil as presribed  and notes normal BMs.    He was referred to GI and saw Dr. Jurado today, who has scheduled him for EGD with BRAVO and Manometry.    Lab from 3/17/17 revealed Hgb-13.6, Glu-130, Hgb A1c-5.9, ALT-79, normal AST, T.Derick, and GGT, negative acute Viral Hepatitis Panel, and Fibrosure -F0      PMH:  1. Hx of GERD and Epigastric Pain           Prevacid 20 mg/day           EGD ([place], [place]) 2/2017 - normal    2. Obesity (BMI-31)    3. HTN          Telmisartan 40 mg/day    4. HPL          Vytorin (Ezetimibe - Simvastatin) 10-40 mg hs    5. BALA          CPAP    6. Fatty Liver with elevated LFS           ALT-79, normal AST, T.Derick, and GGT           Acute Viral Hepatitis Panel - negative           Fibrosure -F0           Abd US ([place], [place])  (2/2017): Fatty Liver           CT Abdomen & Pelvis  ([place], [place]) (2/2017): normal    7. Hx of Colon Polyps          C-Scope 9/2016 ([place])  - polyp          C-Scope 2/2017 ([place], [place]) - normal    8. DDD C/S with (B) Radiculopathy         Pain & Numbness (B) UEss         Hx of Cervical Fusion    9. Chronic LBP with Radiculopathy          Hx of DJD L/S Spine            10. Anxiety              Xanax prn              Lexapro 20 mg/day    11. AR            Claritin daily    12. Hemorrhoids    13. Insomnia    14. Prior Hx of ETOH Abuse    15. Pre-DM        MEDS:        Claritin daily        Lexapro 20 mg/day        Metamucil        MVI        Norco  mg - 1/2 tablet daily prn        Prevacid 20 mg/day        Telmisartan 4o mg/day        Vytorin (Ezetimibe - Simvastatin) 10-40 mg hs        Xanax prn      ALLERGIES:        Hycosamine - blurred Vision    Surgical Hx:        Appendectomy        C-Fusion    Social Hx:      Smoking Hx: Non-Smoker, occasional Cigar      ETOH: Prior past hx of heavy ETOH intake 4-12 drinks per day      Exercise: None      Work Hx:  in [place]      Home Environment:       Family Hx:       (+) HTN - Mother       (+) DM - Father       (+) Colon CA -  MGF       (+) Breast CA - PGM       (+) Melanoma - Father      Preventive Health Screening & Recent Lab Results:              Annual PE: 3/17/17   CBC (3/2017): H/H-13.6/41.6   CMP (3/2017): Glu-130, ALT-79   TSH (3/2017): 2.642   Lipids   Hgb A1c (3/2017): 5.9      Eye Exam   Colonoscopy 9/2016 - polyp                         C-Scope 2/2017 ([facility]) - normal   Immunizations     Influenza    Tdp   Other:                     TTG                     Hepatitis Panel                     Fibrosure                     Abdominal US 2/2017 ([facility]): c/w Fatty Liver.                     EGD (2/3/17) ([facility]) : normal                     CT Abd/Pelvis ([facility]) (2/8/17): normal                      Cardiac Cath  ([facility]) 2/2017: normal          Review of Systems    Objective:      Physical Exam    Assessment:   1. HTN  2. Obesity  3. HPL  4. GERD  5. BALA  6. Fatty Liver with elevated LFS  7. Pre-DM  8. DDD C/S and L/S with (B) Radiculopathy  9. Hx of Colon Polyps  10. Anxiety & Insomnia  11. Mild Anemia  Plan:   1. CBC, S. Ferritin, S.Iron/TIBC, Lipids  2. Hepatology Consult  3. Follow up with GI to include EGD, Manometry in June.  4. Continue to lose weight  5. RTC prn    30 minutes spent with patient discussing medical problems and management.

## 2017-04-19 NOTE — MR AVS SNAPSHOT
[facility]  [address]  [place] [address]  Phone: [phone]  Fax: [phone]                  Janny Duncan   2017 8:30 AM   Office Visit    Description:  Male : 1965   Provider:  Ponce Jurado MD   Department:  [facility]           Diagnoses this Visit        Comments    Gastroesophageal reflux disease, esophagitis presence not specified    -  Primary            To Do List           Future Appointments        Provider Department Dept Phone    2017 10:40 AM Sona Boyd MD [facility] - Internal Medicine [phone]      Goals (5 Years of Data)     None      [facility]     [facility] On Call Nurse Care Line -  Assistance  Unless otherwise directed by your provider, please contact [facility], our nurse care line that is available for  assistance.     Registered nurses in the [facility] On Call Center provide: appointment scheduling, clinical advisement, health education, and other advisory services.  Call: [phone] (toll free)               Medications           Message regarding Medications     Verify the changes and/or additions to your medication regime listed below are the same as discussed with your clinician today.  If any of these changes or additions are incorrect, please notify your healthcare provider.        STOP taking these medications     pantoprazole (PROTONIX) 40 MG tablet Take 1 tablet (40 mg total) by mouth 2 (two) times daily.           Verify that the below list of medications is an accurate representation of the medications you are currently taking.  If none reported, the list may be blank. If incorrect, please contact your healthcare provider. Carry this list with you in case of emergency.           Current Medications     escitalopram oxalate (LEXAPRO) 20 MG tablet Take 1 tablet (20 mg total) by mouth once daily.    hydrocodone-acetaminophen 10-325mg (NORCO)  mg Tab TK 1 T PO BID PRN P     "hyoscyamine (ANASPAZ,LEVSIN) 0.125 mg Tab Take 125 mcg by mouth every 4 (four) hours as needed.    lansoprazole (PREVACID) 30 MG capsule Take 30 mg by mouth once daily.    loratadine (CLARITIN) 10 mg tablet Take 10 mg by mouth once daily.    multivitamin (ONE DAILY MULTIVITAMIN) per tablet Take 1 tablet by mouth once daily.    telmisartan (MICARDIS) 40 MG Tab Take 1 tablet by mouth every evening.    tramadol (ULTRAM) 50 mg tablet TK 1 T PO BID PRN    VYTORIN 10-40 10-40 mg per tablet Take 1 tablet by mouth every evening.           Clinical Reference Information           Your Vitals Were     BP Pulse Height Weight BMI    127/81 76 5' 10" (1.778 m) 97.1 kg (214 lb 1.1 oz) 30.72 kg/m2      Blood Pressure          Most Recent Value    BP  127/81      Allergies as of 4/19/2017     Hyoscyamine      Immunizations Administered on Date of Encounter - 4/19/2017     None      Orders Placed During Today's Visit      Normal Orders This Visit    Case request GI: ESOPHAGEAL BRAVO PH OFF PPI, ESOPHAGOGASTRODUODENOSCOPY (EGD), MANOMETRY-ESOPHAGEAL-HIGH RESOLUTION       Smoking Cessation     If you would like to quit smoking:   You may be eligible for free services if you are a [place] resident and started smoking cigarettes before September 1, 1988.  Call the Smoking Cessation [employer] ([facility]) toll free at [phone] or [phone].   Call 1-800-QUIT-NOW if you do not meet the above criteria.   Contact us via email: [email or web address]   View our website for more information: [email or web address]        Language Assistance Services     ATTENTION: Language assistance services are available, free of charge. Please call [phone].      ATENCIÓN: Si habla español, tiene a ernst disposición servicios gratuitos de asistencia lingüística. Llame al [phone].     KELLY Ý: N?u b?n nói Ti?ng Vi?t, có các d?ch v? h? tr? ngôn ng? mi?n phí dành cho b?n. G?i s? [phone].         [facility] complies " with applicable Federal civil rights laws and does not discriminate on the basis of race, color, national origin, age, disability, or sex.

## 2017-04-19 NOTE — LETTER
April 19, 2017      Sona Boyd MD  [address]  [facility] [address]           [facility]  [address]  [facility] [address]  Phone: [phone]  Fax: [phone]          Patient: Janny Duncan   MR Number: [id number]   YOB: 1965   Date of Visit: 4/19/2017       Dear Dr. Sona Boyd:    Thank you for referring Janny Duncan to me for evaluation. Attached you will find relevant portions of my assessment and plan of care.    If you have questions, please do not hesitate to call me. I look forward to following Janny Duncan along with you.    Sincerely,    Ponce Jurado MD    Enclosure  CC:  No Recipients    If you would like to receive this communication electronically, please contact [email or web address] or [phone] to request more information on [facility] access.    For providers and/or their staff who would like to refer a patient to [facility], please contact us through our one-stop-shop provider referral line, [facility], at [phone].    If you feel you have received this communication in error or would no longer like to receive these types of communications, please e-mail [email or web address]

## 2017-04-19 NOTE — PROGRESS NOTES
[facility] Consultation Note    Reason for Consult:  Gastroesophageal reflux    PCP: Dr. Patrick Ackerman     Referring MD:   Sona Boyd Md  [address] [place] [address]     Local GI: Dr. Demario Rivas    HPI:  This is a 52 y.o. male here for evaluation of gastroesophageal reflux.  Domenico 2 diffuse pain in belly, lethargic, facial swelling.  Had taken neurontin 3 days prior.  Got cardiac cath which was negative.    Typical Symptoms (high pretest probability=impedance on meds):  Pyrosis - now gone. Feels more like a pressure  Regurgitation/Water Brash - some when lying down  Upright/Nocturnal symptoms - does not notice during day  Dysphagia/Odynophagia - occasional lump  PPI usage/history/response - Dexilant helped initially but came back after finishing,  Then Protonix bid per PCP with some improvement, now taking prevacid q 3days.  Taking levsin at night to try to help with GERD    Atypical Symptoms (low pretest probability=bravo off meds):  Hoarseness/Cough - some initial hoarseness which has improved  Throat clearing - no  Chest pain - some tightness in left side of chest  Asthma - some SOB    Late Meals - no  Food Triggers - red gravy, tomatoes  Caffeine intake - rare  Alcohol intake - used to drink beer  NSAID usage - no but was taking norco    ROS:  Constitutional: No fevers, chills, + 20 lb weight gain  ENT: No allergies  CV: No chest pain  Pulm: No cough, No shortness of breath  Ophtho: No vision changes  GI: see HPI  Derm: No rash  Heme: No lymphadenopathy, No bruising  MSK: No arthritis  : No dysuria, No hematuria  Endo: No hot or cold intolerance  Neuro: No syncope, No seizure  Psych: No anxiety, No depression    Medical History:  has a past medical history of Anxiety; Fatty liver; HLD (hyperlipidemia); HTN (hypertension); BALA on CPAP; and Radiculopathy.    Surgical History:  has a past surgical history that includes Cervical fusion and Appendectomy  "(1996).    Family History: family history includes Colon cancer in his maternal grandfather. There is no history of Inflammatory bowel disease or Celiac disease..     Social History:  reports that he has been smoking Cigars.  He does not have any smokeless tobacco history on file.    Review of patient's allergies indicates:   Allergen Reactions    Hyoscyamine Other (See Comments)     Blurred Vision       Current Outpatient Prescriptions   Medication Sig Dispense Refill    escitalopram oxalate (LEXAPRO) 20 MG tablet Take 1 tablet (20 mg total) by mouth once daily. 30 tablet 11    hydrocodone-acetaminophen 10-325mg (NORCO)  mg Tab TK 1 T PO BID PRN P  0    hyoscyamine (ANASPAZ,LEVSIN) 0.125 mg Tab Take 125 mcg by mouth every 4 (four) hours as needed.      lansoprazole (PREVACID) 30 MG capsule Take 30 mg by mouth once daily.      loratadine (CLARITIN) 10 mg tablet Take 10 mg by mouth once daily.      multivitamin (ONE DAILY MULTIVITAMIN) per tablet Take 1 tablet by mouth once daily.      telmisartan (MICARDIS) 40 MG Tab Take 1 tablet by mouth every evening.  1    VYTORIN 10-40 10-40 mg per tablet Take 1 tablet by mouth every evening.  1    tramadol (ULTRAM) 50 mg tablet TK 1 T PO BID PRN  0     No current facility-administered medications for this visit.          Objective Findings:    Vital Signs:  /81  Pulse 76  Ht 5' 10" (1.778 m)  Wt 97.1 kg (214 lb 1.1 oz)  BMI 30.72 kg/m2  Body mass index is 30.72 kg/(m^2).    Physical Exam:  General Appearance: Well appearing in no acute distress  Head:   Normocephalic, without obvious abnormality  Eyes:    No scleral icterus, EOMI  ENT: Neck supple, Lips, mucosa, and tongue normal; teeth and gums normal  Lungs: CTA bilaterally in anterior and posterior fields, no wheezes, no crackles.  Heart:  Regular rate and rhythm, S1, S2 normal, no murmurs heard  Abdomen: Soft, non tender, non distended with positive bowel sounds in all four quadrants. No " hepatosplenomegaly, ascites, or mass  Extremities: 2+ pulses, no clubbing, cyanosis or edema  Skin: No rash  Neurologic: CN II-XII intact      Labs:  Lab Results   Component Value Date    WBC 5.19 03/07/2017    HGB 13.6 (L) 03/07/2017    HCT 41.6 03/07/2017     03/07/2017    ALT 79 (H) 03/07/2017    AST 38 03/07/2017     03/07/2017    K 3.9 03/07/2017     03/07/2017    CREATININE 1.1 03/07/2017    BUN 14 03/07/2017    CO2 33 (H) 03/07/2017    TSH 2.642 03/07/2017    HGBA1C 5.9 03/07/2017         Imaging:  Ultrasound - wnl  CT - wnl    Endoscopy:    EGD/Colon - 2017 - wnl    Assessment:  1. Gastroesophageal reflux disease, esophagitis presence not specified           Recommendations:  1. EGD with Bravo off PPI  2. Manometry to see if spasm or achalasia    No Follow-up on file.      Order summary:  Orders Placed This Encounter    Case request GI: ESOPHAGEAL BRAVO PH OFF PPI, ESOPHAGOGASTRODUODENOSCOPY (EGD), MANOMETRY-ESOPHAGEAL-HIGH RESOLUTION         Thank you so much for allowing me to participate in the care of SolimanLeo Jurado MD

## 2017-06-29 ENCOUNTER — HOSPITAL ENCOUNTER (OUTPATIENT)
Facility: HOSPITAL | Age: 52
Discharge: HOME | End: 2017-06-29
Attending: INTERNAL MEDICINE | Admitting: INTERNAL MEDICINE
Payer: COMMERCIAL

## 2017-06-29 ENCOUNTER — ANESTHESIA EVENT (OUTPATIENT)
Dept: ENDOSCOPY | Facility: HOSPITAL | Age: 52
End: 2017-06-29
Payer: COMMERCIAL

## 2017-06-29 ENCOUNTER — SURGERY (OUTPATIENT)
Age: 52
End: 2017-06-29

## 2017-06-29 ENCOUNTER — ANESTHESIA (OUTPATIENT)
Dept: ENDOSCOPY | Facility: HOSPITAL | Age: 52
End: 2017-06-29
Payer: COMMERCIAL

## 2017-06-29 VITALS — RESPIRATION RATE: 15 BRPM

## 2017-06-29 VITALS
WEIGHT: 210 LBS | RESPIRATION RATE: 12 BRPM | DIASTOLIC BLOOD PRESSURE: 83 MMHG | SYSTOLIC BLOOD PRESSURE: 124 MMHG | TEMPERATURE: 99 F | HEART RATE: 74 BPM | HEIGHT: 70 IN | BODY MASS INDEX: 30.06 KG/M2 | OXYGEN SATURATION: 98 %

## 2017-06-29 DIAGNOSIS — K21.00 GASTROESOPHAGEAL REFLUX DISEASE WITH ESOPHAGITIS: Primary | ICD-10-CM

## 2017-06-29 PROCEDURE — 88305 TISSUE EXAM BY PATHOLOGIST: CPT

## 2017-06-29 PROCEDURE — 43239 EGD BIOPSY SINGLE/MULTIPLE: CPT | Performed by: INTERNAL MEDICINE

## 2017-06-29 PROCEDURE — 91037 ESOPH IMPED FUNCTION TEST: CPT | Performed by: INTERNAL MEDICINE

## 2017-06-29 PROCEDURE — 43239 EGD BIOPSY SINGLE/MULTIPLE: CPT | Mod: ,,, | Performed by: INTERNAL MEDICINE

## 2017-06-29 PROCEDURE — 91010 ESOPHAGUS MOTILITY STUDY: CPT | Performed by: INTERNAL MEDICINE

## 2017-06-29 PROCEDURE — 27200942: Performed by: INTERNAL MEDICINE

## 2017-06-29 PROCEDURE — D9220A PRA ANESTHESIA: Mod: ANES,,, | Performed by: ANESTHESIOLOGY

## 2017-06-29 PROCEDURE — 88305 TISSUE EXAM BY PATHOLOGIST: CPT | Mod: 26,,,

## 2017-06-29 PROCEDURE — 37000008 HC ANESTHESIA 1ST 15 MINUTES: Performed by: INTERNAL MEDICINE

## 2017-06-29 PROCEDURE — 91035 G-ESOPH REFLX TST W/ELECTROD: CPT | Performed by: INTERNAL MEDICINE

## 2017-06-29 PROCEDURE — 37000009 HC ANESTHESIA EA ADD 15 MINS: Performed by: INTERNAL MEDICINE

## 2017-06-29 PROCEDURE — 27201012 HC FORCEPS, HOT/COLD, DISP: Performed by: INTERNAL MEDICINE

## 2017-06-29 PROCEDURE — 91010 ESOPHAGUS MOTILITY STUDY: CPT | Mod: 26,,, | Performed by: INTERNAL MEDICINE

## 2017-06-29 PROCEDURE — D9220A PRA ANESTHESIA: Mod: CRNA,,, | Performed by: NURSE ANESTHETIST, CERTIFIED REGISTERED

## 2017-06-29 PROCEDURE — 63600175 PHARM REV CODE 636 W HCPCS: Performed by: NURSE ANESTHETIST, CERTIFIED REGISTERED

## 2017-06-29 PROCEDURE — 25000003 PHARM REV CODE 250: Performed by: INTERNAL MEDICINE

## 2017-06-29 PROCEDURE — 91035 G-ESOPH REFLX TST W/ELECTROD: CPT | Mod: 26,,, | Performed by: INTERNAL MEDICINE

## 2017-06-29 PROCEDURE — 25000003 PHARM REV CODE 250: Performed by: NURSE ANESTHETIST, CERTIFIED REGISTERED

## 2017-06-29 RX ORDER — GLYCOPYRROLATE 0.2 MG/ML
INJECTION INTRAMUSCULAR; INTRAVENOUS
Status: DISCONTINUED | OUTPATIENT
Start: 2017-06-29 | End: 2017-06-29

## 2017-06-29 RX ORDER — PROPOFOL 10 MG/ML
VIAL (ML) INTRAVENOUS CONTINUOUS PRN
Status: DISCONTINUED | OUTPATIENT
Start: 2017-06-29 | End: 2017-06-29

## 2017-06-29 RX ORDER — PROPOFOL 10 MG/ML
VIAL (ML) INTRAVENOUS
Status: DISCONTINUED | OUTPATIENT
Start: 2017-06-29 | End: 2017-06-29

## 2017-06-29 RX ORDER — LIDOCAINE HCL/PF 100 MG/5ML
SYRINGE (ML) INTRAVENOUS
Status: DISCONTINUED | OUTPATIENT
Start: 2017-06-29 | End: 2017-06-29

## 2017-06-29 RX ORDER — LIDOCAINE HYDROCHLORIDE 20 MG/ML
JELLY TOPICAL ONCE
Status: COMPLETED | OUTPATIENT
Start: 2017-06-29 | End: 2017-06-29

## 2017-06-29 RX ORDER — SODIUM CHLORIDE 9 MG/ML
INJECTION, SOLUTION INTRAVENOUS CONTINUOUS
Status: DISCONTINUED | OUTPATIENT
Start: 2017-06-29 | End: 2017-06-29 | Stop reason: HOSPADM

## 2017-06-29 RX ADMIN — PROPOFOL 200 MCG/KG/MIN: 10 INJECTION, EMULSION INTRAVENOUS at 10:06

## 2017-06-29 RX ADMIN — PROPOFOL 90 MG: 10 INJECTION, EMULSION INTRAVENOUS at 10:06

## 2017-06-29 RX ADMIN — LIDOCAINE HYDROCHLORIDE 10 ML: 20 JELLY TOPICAL at 09:06

## 2017-06-29 RX ADMIN — GLYCOPYRROLATE 0.2 MG: 0.2 INJECTION, SOLUTION INTRAMUSCULAR; INTRAVENOUS at 10:06

## 2017-06-29 RX ADMIN — LIDOCAINE HYDROCHLORIDE 80 MG: 20 INJECTION, SOLUTION INTRAVENOUS at 10:06

## 2017-06-29 RX ADMIN — PROPOFOL 20 MG: 10 INJECTION, EMULSION INTRAVENOUS at 10:06

## 2017-06-29 RX ADMIN — SODIUM CHLORIDE: 0.9 INJECTION, SOLUTION INTRAVENOUS at 09:06

## 2017-06-29 NOTE — OR NURSING
Bravo pt teaching done with pt and daughter who verbalize understanding and will return monitor on Saturday

## 2017-06-29 NOTE — OR NURSING
Catheter to right naris without difficulty. Gagging and slight to moderated discomfort reported and noted during placement. Tolerated well. To room 10 for EGD Bravo.

## 2017-06-29 NOTE — ANESTHESIA POSTPROCEDURE EVALUATION
"Anesthesia Post Evaluation    Patient: Janny Duncan    Procedure(s) Performed: Procedure(s) (LRB):  MANOMETRY-ESOPHAGEAL-WITH IMPEDANCE (N/A)  ESOPHAGOGASTRODUODENOSCOPY (EGD) (N/A)  ESOPHAGEAL BRAVO PH OFF PPI (N/A)    Final Anesthesia Type: general  Patient location during evaluation: GI PACU  Patient participation: Yes- Able to Participate  Level of consciousness: awake and alert, oriented and awake  Post-procedure vital signs: reviewed and stable  Pain management: adequate  Airway patency: patent  PONV status at discharge: No PONV  Anesthetic complications: no      Cardiovascular status: blood pressure returned to baseline and hemodynamically stable  Respiratory status: unassisted, spontaneous ventilation and room air  Hydration status: euvolemic  Follow-up not needed.        Visit Vitals  /83 (BP Location: Left arm, BP Method: Automatic)   Pulse 74   Temp 37.1 °C (98.7 °F) (Oral)   Resp 12   Ht 5' 10" (1.778 m)   Wt 95.3 kg (210 lb)   SpO2 98%   BMI 30.13 kg/m²       Pain/Vera Score: Pain Assessment Performed: Yes (6/29/2017 11:03 AM)  Presence of Pain: denies (6/29/2017 11:03 AM)  Pain Rating Prior to Med Admin: 0 (6/29/2017 11:03 AM)  Pain Rating Post Med Admin: 0 (6/29/2017 11:03 AM)  Vera Score: 10 (6/29/2017 11:03 AM)      "

## 2017-06-29 NOTE — H&P
Short Stay Endoscopy History and Physical    PCP - Primary Doctor No     Procedure - EGD/Motility  ASA - per anesthesia  Mallampati - per anesthesia  History of Anesthesia problems - no  Family history Anesthesia problems -  no   Plan of anesthesia - General    HPI:  This is a 52 y.o. male here for evaluation of :     Reflux - yes  Dysphagia - yes  Abdominal pain - no  Diarrhea - no    ROS:  Constitutional: No fevers, chills, No weight loss  CV: No chest pain  Pulm: No cough, No shortness of breath  Ophtho: No vision changes  GI: see HPI  Derm: No rash    Medical History:  has a past medical history of Anxiety; Fatty liver; HLD (hyperlipidemia); HTN (hypertension); BALA on CPAP; and Radiculopathy.    Surgical History:  has a past surgical history that includes Cervical fusion and Appendectomy (1996).    Family History: family history includes Colon cancer in his maternal grandfather.. Otherwise no colon cancer, inflammatory bowel disease, or GI malignancies.    Social History:  reports that he has been smoking Cigars.  He does not have any smokeless tobacco history on file. He reports that he drinks about 3.0 oz of alcohol per week .    Review of patient's allergies indicates:  No Known Allergies    Medications:   Prescriptions Prior to Admission   Medication Sig Dispense Refill Last Dose    escitalopram oxalate (LEXAPRO) 20 MG tablet Take 1 tablet (20 mg total) by mouth once daily. 30 tablet 11 6/28/2017 at Unknown time    hydrocodone-acetaminophen 10-325mg (NORCO)  mg Tab TK 1 T PO BID PRN P  0 Past Week at Unknown time    hyoscyamine (ANASPAZ,LEVSIN) 0.125 mg Tab Take 125 mcg by mouth every 4 (four) hours as needed.   6/28/2017 at Unknown time    loratadine (CLARITIN) 10 mg tablet Take 10 mg by mouth once daily.   6/28/2017 at Unknown time    multivitamin (ONE DAILY MULTIVITAMIN) per tablet Take 1 tablet by mouth once daily.   6/28/2017 at Unknown time    telmisartan (MICARDIS) 40 MG Tab Take 1  tablet by mouth every evening.  1 6/28/2017 at Unknown time    tramadol (ULTRAM) 50 mg tablet TK 1 T PO BID PRN  0 Past Month at Unknown time    VYTORIN 10-40 10-40 mg per tablet Take 1 tablet by mouth every evening.  1 6/28/2017 at Unknown time    lansoprazole (PREVACID) 30 MG capsule Take 30 mg by mouth once daily.   More than a month at Unknown time       Physical Exam:    Vital Signs:   Vitals:    06/29/17 0859   BP: 117/72   Pulse: 79   Resp: 15   Temp: 98.6 °F (37 °C)       General Appearance: Well appearing in no acute distress  Eyes:    No scleral icterus  ENT: Neck supple, Lips, mucosa, and tongue normal; teeth and gums normal  Lungs: CTA anteriorly  Heart:  Regular rate, S1, S2 normal, no murmurs heard.  Abdomen: Soft, non tender, non distended with normal bowel sounds. No hepatosplenomegaly, ascites, or mass.  Extremities: No edema  Skin: No rash    Labs:  Lab Results   Component Value Date    WBC 4.67 06/29/2017    HGB 14.3 06/29/2017    HCT 42.4 06/29/2017     06/29/2017    ALT 79 (H) 03/07/2017    AST 38 03/07/2017     03/07/2017    K 3.9 03/07/2017     03/07/2017    CREATININE 1.1 03/07/2017    BUN 14 03/07/2017    CO2 33 (H) 03/07/2017    TSH 2.642 03/07/2017    HGBA1C 5.9 03/07/2017       I have explained the risks and benefits of endoscopy procedures to the patient including but not limited to bleeding, perforation, infection, and death.      Ponce Jurado MD

## 2017-06-29 NOTE — TRANSFER OF CARE
"Anesthesia Transfer of Care Note    Patient: Janny Duncan    Procedure(s) Performed: Procedure(s) (LRB):  MANOMETRY-ESOPHAGEAL-WITH IMPEDANCE (N/A)  ESOPHAGOGASTRODUODENOSCOPY (EGD) (N/A)  ESOPHAGEAL BRAVO PH OFF PPI (N/A)    Patient location: PACU    Anesthesia Type: general    Transport from OR: Transported from OR on room air with adequate spontaneous ventilation    Post pain: adequate analgesia    Post assessment: no apparent anesthetic complications and tolerated procedure well    Post vital signs: stable    Level of consciousness: awake and alert    Nausea/Vomiting: no nausea/vomiting    Complications: none    Transfer of care protocol was followed      Last vitals:   Visit Vitals  /72   Pulse 79   Temp 37 °C (98.6 °F)   Resp 15   Ht 5' 10" (1.778 m)   Wt 95.3 kg (210 lb)   SpO2 97%   BMI 30.13 kg/m²     "

## 2017-06-29 NOTE — ANESTHESIA PREPROCEDURE EVALUATION
06/29/2017  Janny Duncan is a 52 y.o., male.    Anesthesia Evaluation    I have reviewed the Patient Summary Reports.    I have reviewed the Nursing Notes.      Review of Systems  Anesthesia Hx:  No problems with previous Anesthesia    Cardiovascular:   Hypertension    Pulmonary:   Sleep Apnea    Hepatic/GI:   Liver Disease,    Musculoskeletal:   Arthritis     Neurological:   Neuromuscular Disease,        Physical Exam  General:  Well nourished, Obesity    Airway/Jaw/Neck:  Airway Findings: Mouth Opening: Normal Tongue: Normal  Mallampati: II  TM Distance: Normal, at least 6 cm  Jaw/Neck Findings:  Neck ROM: Normal ROM     Eyes/Ears/Nose:  Eyes/Ears/Nose Findings:    Dental:  Dental Findings: In tact   Chest/Lungs:  Chest/Lungs Findings: Normal Respiratory Rate     Heart/Vascular:  Heart Findings: Rate: Normal  Rhythm: Regular Rhythm        Mental Status:  Mental Status Findings:  Cooperative, Alert and Oriented         Anesthesia Plan  Type of Anesthesia, risks & benefits discussed:  Anesthesia Type:  general  Patient's Preference: general  Intra-op Monitoring Plan: standard ASA monitors  Intra-op Monitoring Plan Comments:   Post Op Pain Control Plan:   Post Op Pain Control Plan Comments:   Induction:   IV  Beta Blocker:  Patient is not currently on a Beta-Blocker (No further documentation required).       Informed Consent: Patient understands risks and agrees with Anesthesia plan.  Questions answered. Anesthesia consent signed with patient.  ASA Score: 2     Day of Surgery Review of History & Physical:    H&P update referred to the surgeon.         Ready For Surgery From Anesthesia Perspective.

## 2017-06-29 NOTE — PATIENT INSTRUCTIONS
Discharge Summary/Instructions after an Endoscopic Procedure  Patient Name: Janny Duncan  Patient MRN: [id number]  Patient YOB: 1965 Thursday, June 29, 2017  Ponce Jurado MD  RESTRICTIONS ON ACTIVITY:  - DO NOT drive a car, operate machinery, make legal/financial decisions, or   drink alcohol until the day after the procedure.    - The following day: return to full activity including work, except no heavy   lifting, straining or running for 3 days if polyps were removed.  - Diet: Eat and drink normally unless instructed otherwise.  TREATMENT FOR COMMON SIDE EFFECTS:  - Mild abdominal pain, bloating or excessive gas: rest, eat lightly and use   a heating pad.  - Sore Throat - treat with throat lozenges. Gargle with warm salt water.  SYMPTOMS TO WATCH FOR AND REPORT TO YOUR PHYSICIAN:  1. Severe abdominal pain or bloating.  2. Pain in chest.  3. Chills or fever occurring within 24 hours after a procedure.  4. A large amount of rectal bleeding, which would show as bright red,   maroon, or black stools. (A small amount of blood from the rectum is not   serious, especially if hemorrhoids are present.)  5. Because air was used during the procedure, expelling large amounts of air   from your rectum or belching is normal.  6. If a bowel prep was taken, you may not have a bowel movement for 1-3   days.  This is normal.  7. Go directly to the emergency room if you notice any of the following:   Chills and/or fever over 101 F   Persistent vomiting   Severe abdominal pain, other than gas cramps   Severe chest pain   Black, tarry stools   Any bleeding - exceeding one tablespoon  Your doctor recommends these additional instructions:  If any biopsies were performed, my office will call you in 5 to 6 business   days with any results.  You have a contact number available for emergencies.  The signs and symptoms   of potential delayed complications were discussed with you.  You may return   to normal activities tomorrow.   Written discharge instructions were   provided to you.   You are being discharged to home.   We are waiting for your pathology results.   The findings and recommendations were discussed with your designated   responsible adult.   Return to your GI clinic after studies are complete.  For questions, problems or results please call your physician - Ponce Jurado MD at Work:  [phone].  [facility], EMERGENCY ROOM PHONE NUMBER: [phone]  IF A COMPLICATION OR EMERGENCY SITUATION ARISES AND YOU ARE UNABLE TO REACH   YOUR PHYSICIAN - GO TO THE EMERGENCY ROOM.  Ponce Jurado MD  6/29/2017 10:31:07 AM  This report has been verified and signed electronically.

## 2017-06-30 ENCOUNTER — OFFICE VISIT (OUTPATIENT)
Dept: HEPATOLOGY | Facility: CLINIC | Age: 52
End: 2017-06-30
Payer: COMMERCIAL

## 2017-06-30 ENCOUNTER — PROCEDURE VISIT (OUTPATIENT)
Dept: HEPATOLOGY | Facility: CLINIC | Age: 52
End: 2017-06-30
Attending: NURSE PRACTITIONER
Payer: COMMERCIAL

## 2017-06-30 ENCOUNTER — LAB VISIT (OUTPATIENT)
Dept: LAB | Facility: HOSPITAL | Age: 52
End: 2017-06-30
Payer: COMMERCIAL

## 2017-06-30 VITALS
RESPIRATION RATE: 18 BRPM | BODY MASS INDEX: 29.95 KG/M2 | WEIGHT: 209.19 LBS | SYSTOLIC BLOOD PRESSURE: 137 MMHG | HEART RATE: 92 BPM | TEMPERATURE: 97 F | DIASTOLIC BLOOD PRESSURE: 68 MMHG | OXYGEN SATURATION: 97 % | HEIGHT: 70 IN

## 2017-06-30 DIAGNOSIS — I10 ESSENTIAL HYPERTENSION: ICD-10-CM

## 2017-06-30 DIAGNOSIS — Z78.9 ALCOHOL USE: ICD-10-CM

## 2017-06-30 DIAGNOSIS — K76.0 FATTY LIVER: ICD-10-CM

## 2017-06-30 DIAGNOSIS — R73.03 PRE-DIABETES: ICD-10-CM

## 2017-06-30 DIAGNOSIS — E78.2 MIXED HYPERLIPIDEMIA: ICD-10-CM

## 2017-06-30 DIAGNOSIS — E66.09 NON MORBID OBESITY DUE TO EXCESS CALORIES: ICD-10-CM

## 2017-06-30 DIAGNOSIS — R74.8 ELEVATED LIVER ENZYMES: ICD-10-CM

## 2017-06-30 DIAGNOSIS — R74.8 ELEVATED LIVER ENZYMES: Primary | ICD-10-CM

## 2017-06-30 LAB
ALBUMIN SERPL BCP-MCNC: 3.7 G/DL
ALP SERPL-CCNC: 68 U/L
ALT SERPL W/O P-5'-P-CCNC: 34 U/L
ANION GAP SERPL CALC-SCNC: 7 MMOL/L
AST SERPL-CCNC: 44 U/L
BASOPHILS # BLD AUTO: 0.01 K/UL
BASOPHILS NFR BLD: 0.2 %
BILIRUB SERPL-MCNC: 0.5 MG/DL
BUN SERPL-MCNC: 12 MG/DL
CALCIUM SERPL-MCNC: 9.2 MG/DL
CERULOPLASMIN SERPL-MCNC: 24 MG/DL
CHLORIDE SERPL-SCNC: 104 MMOL/L
CO2 SERPL-SCNC: 27 MMOL/L
CREAT SERPL-MCNC: 0.9 MG/DL
DIFFERENTIAL METHOD: ABNORMAL
EOSINOPHIL # BLD AUTO: 0.1 K/UL
EOSINOPHIL NFR BLD: 1.3 %
ERYTHROCYTE [DISTWIDTH] IN BLOOD BY AUTOMATED COUNT: 13.1 %
EST. GFR  (AFRICAN AMERICAN): >60 ML/MIN/1.73 M^2
EST. GFR  (NON AFRICAN AMERICAN): >60 ML/MIN/1.73 M^2
GLUCOSE SERPL-MCNC: 87 MG/DL
HBV CORE AB SERPL QL IA: NEGATIVE
HBV SURFACE AB SER-ACNC: NEGATIVE M[IU]/ML
HCT VFR BLD AUTO: 40.2 %
HEPATITIS A ANTIBODY, IGG: NEGATIVE
HGB BLD-MCNC: 13.7 G/DL
INR PPP: 1
LYMPHOCYTES # BLD AUTO: 1.5 K/UL
LYMPHOCYTES NFR BLD: 23.9 %
MCH RBC QN AUTO: 31.6 PG
MCHC RBC AUTO-ENTMCNC: 34.1 %
MCV RBC AUTO: 93 FL
MONOCYTES # BLD AUTO: 0.4 K/UL
MONOCYTES NFR BLD: 6.5 %
NEUTROPHILS # BLD AUTO: 4.2 K/UL
NEUTROPHILS NFR BLD: 67.9 %
PLATELET # BLD AUTO: 158 K/UL
PMV BLD AUTO: 10.6 FL
POTASSIUM SERPL-SCNC: 4.4 MMOL/L
PROT SERPL-MCNC: 6.8 G/DL
PROTHROMBIN TIME: 10.6 SEC
RBC # BLD AUTO: 4.34 M/UL
SODIUM SERPL-SCNC: 138 MMOL/L
WBC # BLD AUTO: 6.19 K/UL

## 2017-06-30 PROCEDURE — 86706 HEP B SURFACE ANTIBODY: CPT

## 2017-06-30 PROCEDURE — 82390 ASSAY OF CERULOPLASMIN: CPT

## 2017-06-30 PROCEDURE — 80053 COMPREHEN METABOLIC PANEL: CPT

## 2017-06-30 PROCEDURE — 85610 PROTHROMBIN TIME: CPT

## 2017-06-30 PROCEDURE — 99999 PR PBB SHADOW E&M-EST. PATIENT-LVL V: CPT | Mod: PBBFAC,,, | Performed by: NURSE PRACTITIONER

## 2017-06-30 PROCEDURE — 86790 VIRUS ANTIBODY NOS: CPT

## 2017-06-30 PROCEDURE — 36415 COLL VENOUS BLD VENIPUNCTURE: CPT

## 2017-06-30 PROCEDURE — 82104 ALPHA-1-ANTITRYPSIN PHENO: CPT

## 2017-06-30 PROCEDURE — 86704 HEP B CORE ANTIBODY TOTAL: CPT

## 2017-06-30 PROCEDURE — 91200 LIVER ELASTOGRAPHY: CPT | Mod: S$GLB,,, | Performed by: NURSE PRACTITIONER

## 2017-06-30 PROCEDURE — 99204 OFFICE O/P NEW MOD 45 MIN: CPT | Mod: S$GLB,,, | Performed by: NURSE PRACTITIONER

## 2017-06-30 PROCEDURE — 85025 COMPLETE CBC W/AUTO DIFF WBC: CPT

## 2017-06-30 PROCEDURE — 86256 FLUORESCENT ANTIBODY TITER: CPT

## 2017-06-30 PROCEDURE — 86038 ANTINUCLEAR ANTIBODIES: CPT

## 2017-06-30 NOTE — PROCEDURES
Procedures Fibroscan Procedure     Name: Janny Duncan  Date of Procedure : 2017   :: Mitra Madrid NP  Diagnosis: NAFLD/alcohol    Probe: M    Fibroscan readin.2 KPa    Fibrosis:F 0-1     CAP readin dB/m    Steatosis: :S2/S3

## 2017-06-30 NOTE — PROGRESS NOTES
I have personally performed a face to face diagnostic evaluation on this patient. I have reviewed and agree with today's findings and the care plan outlined by Mitra Madrid NP      My findings are as follows:  Patient presents with elevated liver tests and fatty liver. The patient denies any symptoms of decompensated cirrhosis, including no ascites or edema, cognitive problems that would suggest hepatic encephalopathy, or GI bleeding from varices. Has had an EGD that did not show varices. He has a history of drinking daily. He stopped when developed UGI symptoms in Jan. Now drinking alcohol socially. Liver function normal. Recommend serologic w/u and fibroscan. Will review outside u/s report.      he will return to Mitra Madrid NP  for follow-up.

## 2017-06-30 NOTE — PATIENT INSTRUCTIONS
Non-Alcoholic Fatty Liver Disease (NAFLD)  NAFLD is a common disease of the liver. It occurs when there is too much fat in the liver. If NAFLD is severe, it can cause liver damage that appears similar to the damage caused by drinking too much alcohol. However, NAFLD is not caused by drinking alcohol. This sheet tells you more about NAFLD and how it can be managed.    How the Liver Works  The liver is an organ located in the upper right side of the abdomen. It has many important functions. These include:  · Metabolizing proteins, carbohydrates, and fats  · Making a substance called bile that helps break down fats  · Storing and releasing sugar (glucose) into the blood to give the body energy  · Removing toxins from the blood  · Helping with the clotting of blood  Understanding NAFLD  A healthy liver may contain some fat. But if too much fat builds up in the liver, this causes NAFLD. NAFLD can be mild, causing fatty liver. Or it can be more severe and show inflammation, as well as the fat, and cause non-alcoholic steatohepatitis (ORTIZ). ORTIZ can lead to cirrhosis. With fatty liver, the liver simply contains more fat than normal. This extra fat usually causes no damage to the liver. With ORTIZ, the fatty liver becomes inflamed over time. ORTIZ is serious because it can lead to scarring of the liver (fibrosis). Over time, the scarring may lead to cirrhosis, which can eventually cause liver failure or liver cancer.  Causes and Risk Factors of NAFLD  The cause of NAFLD is unknown. But certain risk factors make the problem more likely to occur. These include:  · Obesity  · Prediabetes or diabetes  · High levels of cholesterol and triglycerides (types of fat found in the blood)  · Exposure to certain medications   Symptoms of NAFLD  Most people with NAFLD have no symptoms. If symptoms do occur, they can include:  · Tiredness  · Weakness  · Weight loss  · Loss of appetite  · Nausea and vomiting  · Abdominal pain and  cramping  · Yellowing of the skin and eyes (jaundice); dark urine, or light-colored stools  · Swelling in the abdomen or legs  Diagnosing NAFLD  Your health care provider may suspect you have NAFLD if routine blood tests show elevated levels of liver enzymes. This may mean that a liver problem is possible. One or more imaging tests, such as an ultrasound, CT scan, or MRI scan, may be done. Additional blood tests may be done to look for other causes of liver disease. A liver biopsy may also be done. During this test, a hollow needle is used to remove a tiny amount of tissue from the liver. This tissue is then studied in a lab. This test can detect signs of damage involving liver tissue. It can also help determine the cause of the damage and tell the difference between fatty liver and ORTIZ.  Treating NAFLD  Treatment for NAFLD varies for each person. Your doctor will monitor your health and treat any symptoms or underlying health problems you have. Your doctor will also work with you to control your risk factors so that damage to your liver is less likely. Your plan may include:  · Losing excess weight  · Getting regular exercise  · Controlling diabetes and high cholesterol or triglyceride levels  · Taking medications and vitamins as prescribed by your doctor  · Quitting smoking  · Avoiding drinking alcohol  · Eating a healthy and balanced diet  Living with NAFLD  If NAFLD is caught early, it can be managed with treatment. Your health care provider will discuss further treatment options with you as needed.  Date Last Reviewed: 11/26/2014 © 2000-2016 [employer]. [address] [place] [address], [place] [address]. All rights reserved. This information is not intended as a substitute for professional medical care. Always follow your healthcare professional's instructions.

## 2017-06-30 NOTE — PROGRESS NOTES
[facility] VISIT NEW PT NOTE    REFERRING PROVIDER: Dr. Sona Boyd    CHIEF COMPLAINT: elevated liver enzymes, fatty liver    HPI: This is a 52 y.o. White male with PMH as below referred for evaluation of elevated liver enzymes and fatty liver. He established care here in 3/2017 after he was having severe burning epigastric pain, associated with 1-2 days of dark stools, fatigue, and bloating and had workup with local providers that was unrevealing for etiology. He sought 2nd opinion here and seen by Dr. Boyd and Dr. Jurado. He is now feeling much better in regards to his chest/epigastric pain. He had EGD yesterday that was normal with a Bravo pH probe placed. He had u/s locally with PCP in 1/2017 that showed fatty liver, normal spleen. He has mildly elevated transaminases, ALT > AST. He reported previous heavy daily ETOH intake 4-12 drinks/night in past. He stopped all alcohol in 1/2017 when his symptoms started. Since he has been feeling better, he has resumed drinking a few beers daily. He has lost 10 lbs since 3/2017. He has been exercising. Was not eating much previously d/t his symptoms but now that he has been feeling better, back to eating normally. He denies any family h/o liver disease. He has normal liver functioning. Ferritin nl. EGD normal yesterday. Plts nl at 175-194. He denies any symptoms of advanced chronic liver disease including jaundice, dark urine, abdominal distention, hematemesis, melena, slowed mentation.       Review of patient's allergies indicates:  No Known Allergies    Medications reviewed in Epic    PMHX:  has a past medical history of Anxiety; Fatty liver; HLD (hyperlipidemia); HTN (hypertension); BALA on CPAP; and Radiculopathy.    PSHX:  has a past surgical history that includes Cervical fusion; Appendectomy (1996); and Upper gastrointestinal endoscopy.    FAMILY HISTORY: Negative for liver disease, reviewed in [facility]    SOCIAL HISTORY:   History   Smoking Status     "Current Some Day Smoker    Types: Cigars   Smokeless Tobacco    Current User    Types: Snuff       History   Alcohol Use    3.0 oz/week    5 Cans of beer per week     Comment: daily        History   Drug Use No         ROS:   GENERAL: Denies fever, chills, (+) weight loss, no fatigue  HEENT: Denies headaches, dizziness, vision/hearing changes  CARDIOVASCULAR: (+) chest pain, no palpitations, or edema  RESPIRATORY: Denies dyspnea, cough  GI: (+) abdominal pain, no rectal bleeding, nausea, vomiting. No change in bowel pattern or color  : Denies dysuria, hematuria   SKIN: Denies rash, itching   NEURO: Denies confusion, memory loss, or mood changes  PSYCH: Denies depression or anxiety  HEME/LYMPH: Denies easy bruising or bleeding      PHYSICAL EXAM:   Friendly White male, in no acute distress; alert and oriented to person, place and time  VITALS: /68 (BP Location: Left arm, Patient Position: Sitting)   Pulse 92   Temp 97.4 °F (36.3 °C) (Oral)   Resp 18   Ht 5' 10" (1.778 m)   Wt 94.9 kg (209 lb 3.5 oz)   SpO2 97%   BMI 30.02 kg/m²   HENT: Normocephalic, without obvious abnormality. Oral mucosa pink and moist. Dentition good.  EYES: Sclerae anicteric. No conjunctival pallor.   NECK: Supple. No masses or cervical adenopathy.  CARDIOVASCULAR: Regular rate and rhythm. No murmurs.  RESPIRATORY: Normal respiratory effort. BBS CTA. No wheezes or crackles.  GI: Soft, non-tender, non-distended. No hepatosplenomegaly. No masses palpable. No ascites.  EXTREMITIES:  No clubbing, cyanosis or edema.  SKIN: Warm and dry. No jaundice. No rashes noted to exposed skin. No telangectasias noted. No palmar erythema.  NEURO:  Normal gate. No asterixis.  PSYCH:  Memory intact. Thought and speech pattern appropriate. Behavior normal. No depression or anxiety noted.      RECENT LABS:    Labs:  Lab Results   Component Value Date    WBC 4.67 06/29/2017    HGB 14.3 06/29/2017    HCT 42.4 06/29/2017     06/29/2017    CHOL " 195 06/29/2017    TRIG 180 (H) 06/29/2017    HDL 61 06/29/2017     03/07/2017    K 3.9 03/07/2017    CREATININE 1.1 03/07/2017    ALT 79 (H) 03/07/2017    AST 38 03/07/2017    ALKPHOS 66 03/07/2017    BILITOT 0.5 03/07/2017    ALBUMIN 4.0 03/07/2017       DIAGNOSTIC STUDIES:  EGD- 6/29/17  Impression:           - Normal esophagus.                        - Normal stomach.                        - Normal examined duodenum. Biopsied.                        - The BRAVO pH capsule was positioned 34 cm from                         the incisors, which was 6 cm proximal to the GE                         junction.    ABD. U/S- 1/2017 - fatty liver, spleen nl    ASSESSMENT:  52 y.o. White male with:  1.  Elevated liver enzymes, likely due to fatty liver  -- will check serologies to complete serologic workup  -- u/s shows fatty liver  2. Fatty liver  -- transaminases mildly elevated, ALT > AST  -- US shows fatty liver  -- synthetic liver function nl, no INR  -- risk factors for fatty liver include obesity, HTN, HLD, pre-diabetes, and his alcohol use is also likely contributing  3. Alcohol use  -- 4-12 drinks/night in past but has decreased this now and stopped completely in 1/2017 when he started with chest/epi abd pain        EDUCATION:   We discussed the manifestations of NAFLD. At this time there is no FDA approved therapy for NAFLD.   The patient and I discussed the importance of diet, exercise, and weight loss for management of NAFLD. We discussed a low fat, low carb/low sugar, high fiber diet, and a goal of 30 minutes of exercise 5 times per week.   Pt is aware that fatty liver can progress to steatohepatitis and possibly to cirrhosis, putting one at increased risk for liver cancer, liver failure, and death.      Discussed his alcohol intake is likely contributing to his fatty liver as well. Discussed need to minimize alcohol as this can cause liver dysfunction. Discussed that alcohol abuse can cause cirrhosis. Pt  understands that continued alcohol abuse can be detrimental to his liver. Discussed potential outcomes of cirrhosis, including liver cancer, liver failure, liver transplant, death.         PLAN:  1. Labs today  2. Fibroscan to assess fibrosis  3. Continue efforts at weight loss  4. Minimize alcohol intake, no daily intake, ok to drink socially if no cirrhosis found  5. Weight loss goal of 15-20 lbs  6. Low fat, low cholesterol, low carb, high fiber diet  7. Exercise, 5 days a week, 30 min a day  8. Recommend good control of cholesterol, blood pressure, blood sugar levels  9. Will check immunity markers for HBV/HAV and arrange for vaccination if needed.   10. F/u pending Fibroscan results     Thank you for allowing me to participate in the care of Janny Duncan    Mitra Madrid, NP-C     Addendum: Fibroscan = F0-F1, no fibrosis. Discussed results with pt. At this time, since no fibrosis, he can continue to follow up locally with PCP and local GI MD.

## 2017-06-30 NOTE — LETTER
June 30, 2017      Sona Boyd MD  [address]  [facility] [address]           [facility] - Hepatology  [address]  [facility] [address]  Phone: [phone]  Fax: [phone]          Patient: Janny Duncan   MR Number: [id number]   YOB: 1965   Date of Visit: 6/30/2017       Dear Dr. Sona Boyd:    Thank you for referring Janny Duncan to me for evaluation. Attached you will find relevant portions of my assessment and plan of care.    If you have questions, please do not hesitate to call me. I look forward to following Janny Duncan along with you.    Sincerely,    Mitra Madrid, WILLIAM    Enclosure  CC:  No Recipients    If you would like to receive this communication electronically, please contact [email or web address] or [phone] to request more information on [employer] access.    For providers and/or their staff who would like to refer a patient to [facility], please contact us through our one-stop-shop provider referral line, [facility] [place], at [phone].    If you feel you have received this communication in error or would no longer like to receive these types of communications, please e-mail [email or web address]

## 2017-07-03 LAB
A1AT PHENOTYP SERPL-IMP: NORMAL BANDS
A1AT SERPL NEPH-MCNC: 112 MG/DL
ANA SER QL IF: NORMAL

## 2017-07-05 ENCOUNTER — PATIENT MESSAGE (OUTPATIENT)
Dept: HEPATOLOGY | Facility: CLINIC | Age: 52
End: 2017-07-05

## 2017-07-05 ENCOUNTER — PATIENT MESSAGE (OUTPATIENT)
Dept: GASTROENTEROLOGY | Facility: CLINIC | Age: 52
End: 2017-07-05

## 2017-07-05 ENCOUNTER — TELEPHONE (OUTPATIENT)
Dept: GASTROENTEROLOGY | Facility: CLINIC | Age: 52
End: 2017-07-05

## 2017-07-05 LAB — SMOOTH MUSCLE AB TITR SER IF: NORMAL {TITER}

## 2017-07-05 RX ORDER — LANSOPRAZOLE 30 MG/1
30 CAPSULE, DELAYED RELEASE ORAL DAILY
Qty: 90 CAPSULE | Refills: 3 | Status: SHIPPED | OUTPATIENT
Start: 2017-07-05

## 2017-07-05 NOTE — TELEPHONE ENCOUNTER
Biopsy [facility] to [facility]

## 2017-07-06 ENCOUNTER — TELEPHONE (OUTPATIENT)
Dept: ENDOSCOPY | Facility: HOSPITAL | Age: 52
End: 2017-07-06

## 2021-05-12 ENCOUNTER — PATIENT MESSAGE (OUTPATIENT)
Dept: RESEARCH | Facility: HOSPITAL | Age: 56
End: 2021-05-12

## 2025-06-11 DIAGNOSIS — R51.9 FACIAL PAIN: Primary | ICD-10-CM
